# Patient Record
Sex: FEMALE | Race: BLACK OR AFRICAN AMERICAN | Employment: UNEMPLOYED
[De-identification: names, ages, dates, MRNs, and addresses within clinical notes are randomized per-mention and may not be internally consistent; named-entity substitution may affect disease eponyms.]

---

## 2023-02-20 ENCOUNTER — HOSPITAL ENCOUNTER (EMERGENCY)
Facility: HOSPITAL | Age: 63
Discharge: HOME | End: 2023-02-20
Attending: EMERGENCY MEDICINE
Payer: MEDICARE

## 2023-02-20 ENCOUNTER — APPOINTMENT (OUTPATIENT)
Facility: HOSPITAL | Age: 63
End: 2023-02-20
Payer: MEDICARE

## 2023-02-20 VITALS
DIASTOLIC BLOOD PRESSURE: 76 MMHG | TEMPERATURE: 98.2 F | SYSTOLIC BLOOD PRESSURE: 136 MMHG | OXYGEN SATURATION: 96 % | HEART RATE: 62 BPM | RESPIRATION RATE: 18 BRPM

## 2023-02-20 DIAGNOSIS — S60.221A CONTUSION OF RIGHT HAND INCLUDING FINGERS, INITIAL ENCOUNTER: Primary | ICD-10-CM

## 2023-02-20 DIAGNOSIS — S60.00XA CONTUSION OF RIGHT HAND INCLUDING FINGERS, INITIAL ENCOUNTER: Primary | ICD-10-CM

## 2023-02-20 PROCEDURE — 99283 EMERGENCY DEPT VISIT LOW MDM: CPT | Performed by: EMERGENCY MEDICINE

## 2023-02-20 PROCEDURE — 73130 X-RAY EXAM OF HAND: CPT

## 2023-02-20 PROCEDURE — 6370000000 HC RX 637 (ALT 250 FOR IP): Performed by: PHYSICIAN ASSISTANT

## 2023-02-20 RX ORDER — HYDROCODONE BITARTRATE AND ACETAMINOPHEN 5; 325 MG/1; MG/1
2 TABLET ORAL
Status: COMPLETED | OUTPATIENT
Start: 2023-02-20 | End: 2023-02-20

## 2023-02-20 RX ORDER — ONDANSETRON 4 MG/1
4 TABLET, ORALLY DISINTEGRATING ORAL
Status: COMPLETED | OUTPATIENT
Start: 2023-02-20 | End: 2023-02-20

## 2023-02-20 RX ADMIN — HYDROCODONE BITARTRATE AND ACETAMINOPHEN 2 TABLET: 5; 325 TABLET ORAL at 12:02

## 2023-02-20 RX ADMIN — ONDANSETRON 4 MG: 4 TABLET, ORALLY DISINTEGRATING ORAL at 12:02

## 2023-02-20 ASSESSMENT — PAIN DESCRIPTION - LOCATION: LOCATION: HAND

## 2023-02-20 ASSESSMENT — PAIN - FUNCTIONAL ASSESSMENT: PAIN_FUNCTIONAL_ASSESSMENT: 0-10

## 2023-02-20 ASSESSMENT — PAIN SCALES - GENERAL: PAINLEVEL_OUTOF10: 10

## 2023-02-20 ASSESSMENT — PAIN DESCRIPTION - ORIENTATION: ORIENTATION: RIGHT

## 2023-02-20 NOTE — ED PROVIDER NOTES
[facility] EMERGENCY DEPT  EMERGENCY DEPARTMENT ENCOUNTER       Pt Name: Valerie Brady  MRN: [id number]  [place] 1960  Date of evaluation: 2/20/2023  Provider: Kennedy Mc PA-C   PCP: Leatha Isidro MD  Note Started: 2:19 PM 2/20/23     CHIEF COMPLAINT       Chief Complaint   Patient presents with    Hand Injury        HISTORY OF PRESENT ILLNESS: 1 or more elements      History From: Patient  HPI Limitations: None  Chronic Conditions: HTN, GERD, CVA, DM  Social Determinants affecting Dx or Tx: none      Valerie Brady is a 58 y.o. female who presents to ED c/o right hand injury. One day ago, pt slammed right hand in car door. Swelling and pain since that time. Pt notes reduced ROM secondary to pain. Pt wrapped hand and applied ICE. Pain worse with ROM. Pt notes hx of laceration with tendon lac from knife. Pt had surgery with hand specialist in [address] [place] [address]. Pt denies numbness, weakness, color change. Nursing Notes were all reviewed and agreed with or any disagreements were addressed in the HPI. PAST HISTORY     Past Medical History:  Past Medical History:   Diagnosis Date    Asthma     CVA (cerebral vascular accident) ([facility] [address] 2009    Diabetes ([facility] [address]     Essential hypertension     GERD (gastroesophageal reflux disease)     Hypertension     Hypothyroid     hypo       Past Surgical History:  Past Surgical History:   Procedure Laterality Date    BREAST REDUCTION SURGERY      CARDIAC CATHETERIZATION  2/3/2014    CARPAL TUNNEL RELEASE      HYSTERECTOMY      OTHER SURGICAL HISTORY      plastic surgery for facial and hand reconstruction    SMALL INTESTINE SURGERY      TOTAL COLECTOMY         Family History:  No family history on file. Social History:  Social History     Socioeconomic History    Marital status:    Tobacco Use    Smoking status: Never    Smokeless tobacco: Never   Substance and Sexual Activity    Alcohol use: No    Drug use: No       Allergies:   Allergies   Allergen Reactions    Ace Inhibitors Anaphylaxis and Swelling    Erythromycin Hives    Sulfa Antibiotics Hives and Rash    Nortriptyline Other (See Comments)     Chest pain    Colchicine Nausea Only    Hydromorphone     Morphine Hives       CURRENT MEDICATIONS      No current facility-administered medications for this encounter. No current outpatient medications on file. PHYSICAL EXAM      Vitals:    02/20/23 0843   BP: 136/76   Pulse: 62   Resp: 18   Temp: 98.2 °F (36.8 °C)   TempSrc: Infrared   SpO2: 96%     Physical Exam  Vitals and nursing note reviewed. Constitutional:       Appearance: Normal appearance. Comments: AA female in NAD. Alert. Appears comfortable. SO at bedside. HENT:      Head: Normocephalic and atraumatic. Right Ear: External ear normal.      Left Ear: External ear normal.      Nose: Nose normal.   Cardiovascular:      Rate and Rhythm: Normal rate and regular rhythm. Pulses:           Radial pulses are 2+ on the right side and 2+ on the left side. Heart sounds: Normal heart sounds. Pulmonary:      Effort: Pulmonary effort is normal.      Breath sounds: No stridor. Musculoskeletal:      Right forearm: Normal.      Left forearm: Normal.      Right wrist: Normal. No crepitus. Normal pulse. Left wrist: Normal. No crepitus. Normal pulse. Right hand: Swelling and tenderness present. Decreased range of motion. Normal capillary refill. Normal pulse. Left hand: Normal.      Cervical back: Normal range of motion. Skin:     General: Skin is warm. Capillary Refill: Capillary refill takes less than 2 seconds. Neurological:      Mental Status: She is alert and oriented to person, place, and time. Psychiatric:         Behavior: Behavior normal.            DIAGNOSTIC RESULTS   LABS:    No results found for this or any previous visit (from the past 24 hour(s)). Labs Reviewed - No data to display      EKG:  When ordered, EKG's are interpreted by the Emergency Department Provider in the absence of a cardiologist.  Please see their note for interpretation of EKG. Read by me. RADIOLOGY:  Non-plain film images such as CT, Ultrasound and MRI are read by the radiologist. Plain radiographic images are visualized and preliminarily interpreted by the ED Provider with the below findings:       Read by me, pending review by radiologist.     Interpretation per the Radiologist below, if available at the time of this note:  XR HAND RIGHT (MIN 3 VIEWS)   Final Result      Diffuse soft tissue swelling, without acute fracture                  PROCEDURES   Unless otherwise noted below, none  Procedures         CRITICAL CARE TIME   non    EMERGENCY DEPARTMENT COURSE and DIFFERENTIAL DIAGNOSIS/MDM   Vitals:    Vitals:    02/20/23 0843   BP: 136/76   Pulse: 62   Resp: 18   Temp: 98.2 °F (36.8 °C)   TempSrc: Infrared   SpO2: 96%       Patient was given the following medications:  Medications   ondansetron (ZOFRAN-ODT) disintegrating tablet 4 mg (4 mg Oral Given 2/20/23 1202)   HYDROcodone-acetaminophen (NORCO) 5-325 MG per tablet 2 tablet (2 tablets Oral Given 2/20/23 1202)           Records Reviewed (source and summary): Nursing notes. ED COURSE       Medial Decision Making:  DDX: sprain, strain, fx, ligamentous, contusion    Imaging unremarkable. Will tx as contusion. ACE wrap. RICE. Pain control. PCP or hand specialist FU. Reasons to RTED discussed with pt. All questions answered. Pt feels comfortable going home at this time. Pt expressed understanding and she agrees with plan. FINAL IMPRESSION     1.  Contusion of right hand including fingers, initial encounter            DISPOSITION/PLAN   DISPOSITION Decision To Discharge 02/20/2023 11:48:14 AM      Discharged       PATIENT REFERRED [place]  [address] [address] [facility]  [phone]      may follow up with hand specialist    [facility] EMERGENCY DEPT  [address] [facility] [place] [address]  [phone]           DISCHARGE MEDICATIONS:     Medication List      You have not been prescribed any medications. I am the Primary Clinician of Record. (Please note that parts of this dictation were completed with voice recognition software. Quite often unanticipated grammatical, syntax, homophones, and other interpretive errors are inadvertently transcribed by the computer software. Please disregards these errors.  Please excuse any errors that have escaped final proofreading.)       Michael Colon PA-C  02/20/23 8887